# Patient Record
Sex: MALE | URBAN - METROPOLITAN AREA
[De-identification: names, ages, dates, MRNs, and addresses within clinical notes are randomized per-mention and may not be internally consistent; named-entity substitution may affect disease eponyms.]

---

## 2021-02-09 ENCOUNTER — OFFICE VISIT (OUTPATIENT)
Dept: URBAN - METROPOLITAN AREA CLINIC 27 | Facility: CLINIC | Age: 46
End: 2021-02-09
Payer: COMMERCIAL

## 2021-02-09 DIAGNOSIS — E10.3513 TYPE 1 DIAB WITH PROLIF DIAB RTNOP WITH MACULAR EDEMA, BI: ICD-10-CM

## 2021-02-09 DIAGNOSIS — H25.13 AGE-RELATED NUCLEAR CATARACT, BILATERAL: ICD-10-CM

## 2021-02-09 PROCEDURE — 99204 OFFICE O/P NEW MOD 45 MIN: CPT | Performed by: OPHTHALMOLOGY

## 2021-02-09 PROCEDURE — 92134 CPTRZ OPH DX IMG PST SGM RTA: CPT | Performed by: OPHTHALMOLOGY

## 2021-02-09 RX ORDER — OFLOXACIN 3 MG/ML
0.3 % SOLUTION/ DROPS OPHTHALMIC
Qty: 5 | Refills: 0 | Status: INACTIVE
Start: 2021-02-09 | End: 2021-03-10

## 2021-02-09 RX ORDER — OFLOXACIN 3 MG/ML
0.3 % SOLUTION/ DROPS OPHTHALMIC
Qty: 5 | Refills: 0 | Status: INACTIVE
Start: 2021-02-09 | End: 2021-02-09

## 2021-02-09 RX ORDER — PREDNISOLONE ACETATE 10 MG/ML
1 % SUSPENSION/ DROPS OPHTHALMIC
Qty: 5 | Refills: 3 | Status: INACTIVE
Start: 2021-02-09 | End: 2021-02-09

## 2021-02-09 RX ORDER — PREDNISOLONE ACETATE 10 MG/ML
1 % SUSPENSION/ DROPS OPHTHALMIC
Qty: 5 | Refills: 3 | Status: INACTIVE
Start: 2021-02-09 | End: 2021-05-09

## 2021-02-09 ASSESSMENT — INTRAOCULAR PRESSURE
OS: 15
OD: 14

## 2021-02-09 NOTE — IMPRESSION/PLAN
Impression: Vitreous hemorrhage, left eye: H43.12 Left. Plan: Unfortunately, the hemorrhage has failed to clear on its own despite adequate time (>1 month by symptoms). The retina remains fully attached. Treatment options including observation, anti-VEGF, and vitrectomy surgery were reviewed in detail. The R/B/A of vitrectomy with membrane peeling and endolaser were discussed in detail. The patient understands the risks of surgery, including (but not limited to) bleeding, pain, infection, loss of vision, loss of eye, and possible need for more surgery. The patient elects to proceed with surgery and an anti-VEGF injection (Avastin) today to reduce the risk of active bleeding from neovascular tissue during surgery. 

Sx plan: 25g PPV/EL OS

## 2021-02-09 NOTE — IMPRESSION/PLAN
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 Plan: There is active PDR OU with VH OS.  OCT confirms DME OD>OS. Recommend Avastin OU today. RBA discussed. Pt elects to proceed - no complications encountered. Surgery OS as above. BS/BP control discussed.

## 2021-02-17 ENCOUNTER — SURGERY (OUTPATIENT)
Dept: URBAN - METROPOLITAN AREA EXTERNAL CLINIC 26 | Facility: EXTERNAL CLINIC | Age: 46
End: 2021-02-17
Payer: COMMERCIAL

## 2021-02-17 PROCEDURE — 67040 LASER TREATMENT OF RETINA: CPT | Performed by: OPHTHALMOLOGY

## 2021-02-18 ENCOUNTER — POST-OPERATIVE VISIT (OUTPATIENT)
Dept: URBAN - METROPOLITAN AREA CLINIC 27 | Facility: CLINIC | Age: 46
End: 2021-02-18
Payer: COMMERCIAL

## 2021-02-18 DIAGNOSIS — H43.12 VITREOUS HEMORRHAGE, LEFT EYE: Primary | ICD-10-CM

## 2021-02-18 PROCEDURE — 99024 POSTOP FOLLOW-UP VISIT: CPT | Performed by: OPHTHALMOLOGY

## 2021-02-18 RX ORDER — TIMOLOL MALEATE 5 MG/ML
0.5 % SOLUTION/ DROPS OPHTHALMIC
Qty: 5 | Refills: 1 | Status: ACTIVE
Start: 2021-02-18

## 2021-02-18 ASSESSMENT — INTRAOCULAR PRESSURE
OS: 26
OD: 27

## 2021-02-18 NOTE — IMPRESSION/PLAN
Impression: S/P 25g PPV/EL OS OS - 1 Day. Vitreous hemorrhage, left eye  H43.12.  Excellent post op course   Post operative instructions reviewed - Condition is improving -will add timolol OS BID Plan: RTC: 1 wk POS w/ SM